# Patient Record
Sex: FEMALE | Race: WHITE | NOT HISPANIC OR LATINO | Employment: OTHER | URBAN - METROPOLITAN AREA
[De-identification: names, ages, dates, MRNs, and addresses within clinical notes are randomized per-mention and may not be internally consistent; named-entity substitution may affect disease eponyms.]

---

## 2018-04-20 PROBLEM — M25.511 ACUTE PAIN OF RIGHT SHOULDER: Status: ACTIVE | Noted: 2018-04-20

## 2018-04-20 PROBLEM — M25.611 DECREASED RANGE OF MOTION OF RIGHT SHOULDER: Status: ACTIVE | Noted: 2018-04-20

## 2019-03-26 ENCOUNTER — OFFICE VISIT (OUTPATIENT)
Dept: SURGERY | Facility: MEDICAL CENTER | Age: 39
End: 2019-03-26
Payer: MEDICARE

## 2019-03-26 VITALS
HEART RATE: 76 BPM | OXYGEN SATURATION: 94 % | HEIGHT: 66 IN | WEIGHT: 154.54 LBS | SYSTOLIC BLOOD PRESSURE: 124 MMHG | DIASTOLIC BLOOD PRESSURE: 86 MMHG | BODY MASS INDEX: 24.84 KG/M2 | TEMPERATURE: 97.9 F

## 2019-03-26 DIAGNOSIS — K76.9 LIVER DISEASE: ICD-10-CM

## 2019-03-26 PROCEDURE — 99204 OFFICE O/P NEW MOD 45 MIN: CPT | Performed by: SURGERY

## 2019-03-26 ASSESSMENT — ENCOUNTER SYMPTOMS
HEADACHES: 1
ABDOMINAL PAIN: 1
BACK PAIN: 1

## 2019-03-26 NOTE — PROGRESS NOTES
Subjective:      Danica Mac is a 39 y.o. female who presents with New Patient (NEW PT/LIVER LESION/MEDICARE)            Subjective:  3/26/2019  9:00 AM  Primary care physician:Betty Izaguirre P.A.-C.  Referring Provider: Debbie Kamara MD      Chief Complaint: Patient presents with:  New Patient: NEW PT/LIVER LESION/MEDICARE    Diagnosis: Liver Mass    History of presenting illness:  Danica Mac  is a pleasant 39 y.o. year old female who presented with what appears to be a liver tumor that was found incidentally during a laparoscopy by her gynecologist.  The patient has a history of endometriosis and pain in the right upper quadrant.  On laparoscopy she was found to have some adhesions around the liver capsule but then there was a liver tumor.  It does appear that it might be in segment 4B but I do not have any imaging of the liver.  Unfortunately an MRI or CT was not completed after the laparoscopy nor was there a liver biopsy.  In any event, the patient states she is not on birth control she has suffered chronic pain in right upper quadrant.  She states someone told her gallbladder was normal but I have no imaging of the gallbladder.  In any event she was referred in to see me regarding the liver tumor.  She has no history of hepatitis or alcoholic cirrhosis.  On the pictures that were sent the liver does appear to be fairly normal except for adhesions and a questionable abnormality in segment 4B.  Patient has no family history of hepatoma or liver cancer.  She has no other malignancies.  She does have chronic fibromyalgia and pain in the right upper quadrant and it does appear some of this may be related to endometriosis.  She is here to discuss next steps.      Past Medical History:  No date: Anxiety  No date: ASTHMA  No date: Back pain  No date: Chicken pox  No date: Depression  No date: ENDOMETRIOSIS  No date: Fibromyalgia  No date: Headache(784.0)  No date: Hypertension  12/10/2013:  Insomnia  No date: Lupus  No date: Seizure (HCC)  Past Surgical History:  4/13/2018: SHOULDER ARTHROSCOPY; Right      Comment:  Procedure: RT SHOULDER ARTHROSCOPY with Externsive                Capsular Release and Manipulation Under Anesthesia;                 Surgeon: Oscar Polo M.D.;  Location: SURGERY                [facility];  Service: Orthopedics  10/6/2015: APPENDECTOMY LAPAROSCOPIC; N/A      Comment:  Procedure: APPENDECTOMY LAPAROSCOPIC;  Surgeon: Arturo Enriquez M.D.;  Location: SURGERY [facility];                 Service:   2/18/2014: MASS EXCISION ENT      Comment:  Performed by Jun Purcell M.D. at SURGERY [facility]  No date: OTHER ORTHOPEDIC SURGERY      Comment:  right ankle,jaw  No date: PRIMARY C SECTION  No date: TUBAL COAGULATION LAPAROSCOPIC BILATERAL   -- Amoxicillin -- Vomiting    --  States onset vomiting after starting amoxicillin   -- Tramadol -- Unspecified    --  seizure   -- Morphine -- Swelling  Outpatient Encounter Prescriptions as of 3/26/2019:  ondansetron (ZOFRAN) 4 MG Tab tablet, TAKE 1 TABLET BY MOUTH ONCE DAILY AS NEEDED FOR  NAUSEA  AND  VOMITING, Disp: 20 Tab, Rfl: 0  amLODIPine (NORVASC) 2.5 MG Tab, TAKE 1 TABLET BY MOUTH AT BEDTIME, Disp: 90 Tab, Rfl: 2  SPIRIVA HANDIHALER 18 MCG Cap, INHALE THE CONTENTS OF 1 CAPSULE BY MOUTH ONCE DAILY, Disp: 90 Cap, Rfl: 1  VENTOLIN  (90 Base) MCG/ACT Aero Soln inhalation aerosol, INHALE 2 PUFFS BY MOUTH EVERY 6 HOURS AS NEEDED FOR SHORTNESS OF BREATH, Disp: 18 g, Rfl: 2  traZODone (DESYREL) 50 MG Tab, Take 50 mg by mouth every evening., Disp: , Rfl:   oxycodone-acetaminophen (PERCOCET) 5-325 MG Tab, Take 1-2 Tabs by mouth every four hours as needed., Disp: , Rfl:   topiramate (TOPAMAX) 100 MG Tab, Take 600 mg by mouth every day., Disp: , Rfl:   zolpidem (AMBIEN) 10 MG Tab, Take 10 mg by mouth every bedtime., Disp: , Rfl:   tizanidine (ZANAFLEX) 4 MG Tab, Take 4 mg by mouth every 6 hours as  "needed., Disp: , Rfl:     No facility-administered encounter medications on file as of 3/26/2019.     Social History    Marital status:              Spouse name:                       Years of education:                 Number of children:               Occupational History    None on file    Social History Main Topics    Smoking status: Current Every Day Smoker                                                     Packs/day: 0.25      Years: 20.00          Last attempt to quit: 1/17/2014    Smokeless tobacco: Never Used                        Alcohol use: No              Drug use: Yes                Comment: marijuana daily    Sexual activity: Yes               Partners with: Male       Birth control/protection: Surgical    Other Topics            Concern    None on file    Social History Narrative    None on file       Smoking status: Current Every Day Smoker                                                   Packs/day: 0.25      Years: 20.00        Last attempt to quit: 1/17/2014  Smokeless tobacco: Never Used                        Alcohol use: No              Drug use: Yes              Comment: marijuana daily       Review of patient's family history indicates:  Problem: Cancer      Relation: Father       Age of Onset: (Not Specified)       @ROS@     Objective:  /86 (BP Location: Right arm, Patient Position: Sitting)   Pulse 76   Temp 36.6 °C (97.9 °F)   Ht 1.676 m (5' 6\")   Wt 70.1 kg (154 lb 8.7 oz)   SpO2 94%   BMI 24.94 kg/m²     [unfilled]    Labs:   ADD LABS FROM RESULT REVIEW all his    Imaging:  Per my read,     Pathology:      Procedures:    Assessment:    There are no diagnoses linked to this encounter.        Medical Decision Making:  Today's Assessment / Status / Plan:    In light of the present findings, my recommendation is that the patient will need imaging of the liver.  I feel an MRI of the liver with contrast will be the most definitive to understand whether this is benign " "versus malignant and if benign what type of benign tumor whether this is an FNH or an adenoma the patient has no risk factors for malignancy but due to her age of 39 feels important that we proceed with a workup.  She will be sent for an MRI of the liver near home and they will contact me after it has been completed so that we can push them electronically and evaluate him without having her drive all the way back to [place].  She understood this and is agreed the above plan.      She agreed and verbalized her agreement and understanding with the current plan. I answered all questions and concerns she has at this time and advised her to call at any time in there interim with questions or concerns in regards to her care.    Thank you for allowing me to participate in her care, I will continue to follow closely.         Please note that this dictation was created using voice recognition software. I have made every reasonable attempt to correct obvious errors, but I expect that there are errors of grammar and possibly content that I did not discover before finalizing the note.     Thank you for this consultation and allowing me to participate in your patient's care. If I can be of further service please contact my office.          Review of Systems   Gastrointestinal: Positive for abdominal pain.   Musculoskeletal: Positive for back pain and joint pain.        Muscle aches  Muscle weakness     Skin: Positive for rash.   Neurological: Positive for headaches.   All other systems reviewed and are negative.         Objective:     /86 (BP Location: Right arm, Patient Position: Sitting)   Pulse 76   Temp 36.6 °C (97.9 °F)   Ht 1.676 m (5' 6\")   Wt 70.1 kg (154 lb 8.7 oz)   SpO2 94%   BMI 24.94 kg/m²      Physical Exam   Constitutional: She is oriented to person, place, and time. She appears well-developed and well-nourished.   HENT:   Head: Normocephalic and atraumatic.   Eyes: Pupils are equal, round, and reactive " to light. EOM are normal.   Neck: Normal range of motion. Neck supple.   Cardiovascular: Normal rate, regular rhythm, normal heart sounds and intact distal pulses.    Pulmonary/Chest: Effort normal and breath sounds normal.   Abdominal: Soft. Bowel sounds are normal.   Musculoskeletal: Normal range of motion.   Neurological: She is alert and oriented to person, place, and time.   Skin: Skin is warm and dry.   Nursing note and vitals reviewed.              Assessment/Plan:     1. Liver mass    There are no diagnoses linked to this encounter.

## 2019-04-04 ENCOUNTER — HOSPITAL ENCOUNTER (OUTPATIENT)
Dept: RADIOLOGY | Facility: MEDICAL CENTER | Age: 39
End: 2019-04-04

## 2019-04-17 PROBLEM — D25.9 UTERINE LEIOMYOMA: Status: ACTIVE | Noted: 2019-04-17

## 2020-02-20 PROBLEM — Z76.89 ENCOUNTER TO ESTABLISH CARE: Status: ACTIVE | Noted: 2020-02-20

## 2020-02-20 PROBLEM — F41.9 ANXIETY: Status: ACTIVE | Noted: 2020-02-20

## 2021-02-02 ENCOUNTER — PATIENT OUTREACH (OUTPATIENT)
Dept: HEALTH INFORMATION MANAGEMENT | Facility: OTHER | Age: 41
End: 2021-02-02

## 2021-02-02 NOTE — PROGRESS NOTES
Called patient to schedule for AWV.    Outcome:   Spoke to patient. Scheduled appt for 2/4/2021.  Confirmed no active COVID-19 symptoms, no current exposure. advised to wear mask.    Attempt # 1  -aep

## 2021-02-04 PROBLEM — R10.11 RIGHT UPPER QUADRANT PAIN: Status: ACTIVE | Noted: 2021-02-04

## 2021-02-04 PROBLEM — F43.23 SITUATIONAL MIXED ANXIETY AND DEPRESSIVE DISORDER: Status: ACTIVE | Noted: 2021-02-04

## 2023-10-23 PROBLEM — I10 PRIMARY HYPERTENSION: Status: ACTIVE | Noted: 2023-10-23
